# Patient Record
Sex: MALE | Race: WHITE | NOT HISPANIC OR LATINO | Employment: FULL TIME | URBAN - METROPOLITAN AREA
[De-identification: names, ages, dates, MRNs, and addresses within clinical notes are randomized per-mention and may not be internally consistent; named-entity substitution may affect disease eponyms.]

---

## 2022-09-09 ENCOUNTER — HOSPITAL ENCOUNTER (OUTPATIENT)
Dept: PHYSICAL THERAPY | Facility: HOSPITAL | Age: 27
Setting detail: THERAPIES SERIES
Discharge: HOME | End: 2022-09-09

## 2022-09-09 DIAGNOSIS — S93.401A SPRAIN OF RIGHT ANKLE, UNSPECIFIED LIGAMENT, INITIAL ENCOUNTER: Primary | ICD-10-CM

## 2022-09-09 PROCEDURE — 97161 PT EVAL LOW COMPLEX 20 MIN: CPT

## 2022-09-09 PROCEDURE — G0283 ELEC STIM OTHER THAN WOUND: HCPCS

## 2022-09-09 PROCEDURE — 97110 THERAPEUTIC EXERCISES: CPT

## 2022-09-09 NOTE — THERAPY EVALUATION
"    Outpatient Physical Therapy Ortho Initial Evaluation   [facility]     Patient Name: Hank Rubi  : 1995  MRN: [id number]  Today's Date: 2022      Visit Date: 2022    There is no problem list on file for this patient.       No past medical history on file.     Past Surgical History:   Procedure Laterality Date   • EAR TUBES         Visit Dx:     ICD-10-CM ICD-9-CM   1. Sprain of right ankle, unspecified ligament, initial encounter  S93.401A 845.00          Patient History     Row Name 22 1400             History    Chief Complaint Joint stiffness;Muscle tenderness;Muscle weakness;Pain;Swelling  -LN      Type of Pain Ankle pain;Foot pain  Right  -LN      Date Current Problem(s) Began 22  -LN      Brief Description of Current Complaint Pt reports that on 22 he was coming down stairs at work and missed a step and fell down about 5-6 steps; reported having right ankle pain & bruising/swelling a day later (bruising 2 days later). X-ray normal. Pt dx with right ankle sprain. Pt is on light duty now at work. He reports popping in right ankle, but relieves it. \"It is better now but still has some soreness- medial ankle/foot and great toe, 2nd & 3rd toes. Pt wears an active ankle brace right ankle.  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; now on light duty/seated duty.  -LN      Patient seeing anyone else for problem(s)? [employer]  -LN      How has patient tried to help current problem? tylenol/advil combo; active ankle; CP/elevation; rest  -LN      What clinical tests have you had for this " problem? X-ray  -LN      Results of Clinical Tests negative for fx  -LN      Related/Recent Hospitalizations No  -LN      Surgery/Hospitalization n/a  -LN      History of Previous Related Injuries none reported  -LN              Pain     Pain Location Ankle;Foot  Right  -LN      Pain at Present 3-4  -LN      Pain at Best 0  -LN      Pain at Worst 5  -LN      Pain Frequency Intermittent  -LN      Pain Description Aching;Tightness  -LN      What Performance Factors Make the Current Problem(s) WORSE? worse at night when he takes the brace off; going up & down stairs;  -LN      What Performance Factors Make the Current Problem(s) BETTER? rest; elevation; CP; tylenol/advil combo  -LN      Tolerance Time- Standing a little limited  -LN      Tolerance Time- Sitting no limitation  -LN      Tolerance Time- Walking okay with ankle support on; except stairs  -LN      Tolerance Time- Lying limited by pain; better with elevation (disturbed 1-2 x week)  -LN      Is your sleep disturbed? Yes  -LN      Difficulties at work? on light duty/seated duty right now  -LN      Difficulties with ADL's? none reported  -LN      Difficulties with recreational activities? none reported  -LN              Fall Risk Assessment    Any falls in the past year: Yes  -LN      Number of falls reported in the last 12 months 2  -LN      Factors that contributed to the fall: Uneven surface;Tripped;Lost balance  stairs  -LN              Services    Prior Rehab/Home Health Experiences No  -LN      Are you currently receiving Home Health services No  -LN      Do you plan to receive Home Health services in the near future No  -LN              Daily Activities    Primary Language English  -LN      How does patient learn best? Listening;Demonstration  -LN      Teaching needs identified Home Exercise Program;Other (comment)  Risks and benefits of treatment explained to pt.  -LN      Patient is concerned about/has problems with Climbing  Stairs;Flexibility;Performing job responsibilities/community activities (work, school,;Performing sports, recreation, and play activities;Standing;Walking  -LN      Does patient have problems with the following? Depression;Anxiety  -LN      Barriers to learning None  -LN      Pt Participated in POC and Goals Yes  -LN              Safety    Are you being hurt, hit, or frightened by anyone at home or in your life? No  -LN      Are you being neglected by a caregiver No  -LN      Have you had any of the following issues with Depression;Anxiety  -LN            User Key  (r) = Recorded By, (t) = Taken By, (c) = Cosigned By    Initials Name Provider Type    LN Rosanna Estes, PT Physical Therapist                 PT Ortho     Row Name 09/09/22 1400       Precautions and Contraindications    Precautions/Limitations no known precautions/limitations  -LN       Subjective Pain    Able to rate subjective pain? yes  -LN    Pre-Treatment Pain Level 3  3-4/10  -LN       Posture/Observations    Observations Edema;Ecchymosis/bruising  -LN    Posture/Observations Comments mild edema noted; minimal bruising noted medial ankle area.  -LN       Foot/Ankle Palpation    Subtalar Joint Right:;Tender  -LN    Medial Malleolus Tender  -LN    Deltoid Ligament Right:;Tender  -LN    ATFL Right:  popping noted with inversion movement but no pain  -LN       Ankle/Foot Special Tests    Anterior drawer (ATFL lesion) Right:;Negative  -LN    Talar tilt test (instability) Right:;Negative  -LN    Frank test (Achilles’ tendon rupture) Right:;Negative  -LN    Shannon’s sign (DVT) Right:;Negative  -LN    Inversion Stress Test Right:;Negative  -LN    Eversion Stress Test Right:;Negative  -LN    Ankle Special Tests Comments no instability noted but did have a little discomfort with anterior drawer/heel distraction.  -LN       Right Lower Ext    Rt Ankle Dorsiflexion AROM 9 degrees- ankle discomfort  -LN    Rt Ankle Plantarflexion AROM 47 degrees-  ankle discomfort  -LN    Rt Ankle Inversion AROM 44 degrees  -LN    Rt Ankle Eversion AROM 12 degrees  -LN       Left Lower Ext    Lt Ankle Dorsiflexion AROM 10 degrees  -LN    Lt Ankle Plantarflexion AROM 55 degrees  -LN    Lt Ankle Inversion AROM 44 degrees  -LN    Lt Ankle Eversion AROM 20 degrees  -LN       MMT Right Lower Ext    Rt Ankle Plantarflexion MMT, Gross Movement (4+/5) good plus  -LN    Rt Ankle Dorsiflexion MMT, Gross Movement (4/5) good  -LN    Rt Ankle Subtalar Inversion MT, Gross Movement (4/5) good  -LN    Rt Ankle Subtalar Eversion MMT, Gross Movement (4-/5) good minus  -LN       MMT Left Lower Ext    Lt Ankle Plantarflexion MMT, Gross Movement (5/5) normal  -LN    Lt Ankle Dorsiflexion MMT, Gross Movement (5/5) normal  -LN    Lt Ankle Subtalar Inversion MMT, Gross Movement (5/5) normal  -LN    Lt Ankle Subtalar Eversion MMT, Gross Movement (5/5) normal  -LN       Sensation    Sensation WNL? WNL  -LN    Light Touch No apparent deficits  -LN       Lower Extremity Flexibility    Gastrocnemius Right:;Moderately limited  -LN    Soleus Right:;Moderately limited  -LN       Ankle Girth    Mid Tarsal- Right 25.2  -LN    Mid Tarsal- Left 23.6  -LN    MTP Joints- Right 23.5  -LN    MTP Joints- Left 24.4  -LN    Above Malleolus- Right 23.4  -LN    Above Malleolus- Left 21.5  -LN    Mid Gastroc- Right 38.7  -LN    Mid Gastroc- Left 38  -LN       Transfers    Sit-Stand [place] (Transfers) independent  -LN    Stand-Sit [place] (Transfers) independent  -LN    Transfers, Sit-Stand-Sit, Assist Device other (see comments)  none  -LN    Comment, (Transfers) Pt independent with all functional mobility and gait.  -LN       Gait/Stairs (Locomotion)    [place] Level (Gait) independent  -LN    Assistive Device (Gait) other (see comments)  none- wearing right active ankle brace  -LN    Deviations/Abnormal Patterns (Gait) right sided deviations;antalgic  -LN    Comment, (Gait/Stairs) Nominal antalgic gait  noted on R; no AD used and good gait speed noted.  He reports stairs are still painful on right ankle.   -LN          User Key  (r) = Recorded By, (t) = Taken By, (c) = Cosigned By    Initials Name Provider Type    Rosanna Alexis, PT Physical Therapist                            Therapy Education  Education Details: Pt to work on HEP 2 x day as tolerated and use CP as needed; jovanny after exercises and after his shift at work. To also elevate ankle as needed and to try to elevate some during his shift at work. Pt to wear active ankle on R per MD when he is working and up on feet.  Given: HEP, Symptoms/condition management, Edema management  Program: New  How Provided: Verbal, Demonstration, Written  Provided to: Patient  Level of Understanding: Teach back education performed, Verbalized, Demonstrated      PT OP Goals     Row Name 09/09/22 1400          PT Short Term Goals    STG Date to Achieve 09/23/22  -LN     STG 1 Pt to verbally report decreased right ankle/foot pain to 2/10 with everyday home and work activities.  -LN     STG 2 Pt independent with initial HEP issued by therapist.  -LN     STG 3 Right ankle AROM improved to 10 degrees df, 55 degrees pf, and 20 degrees eversion (= to L).  -LN     STG 4 Right ankle strength improved by 1/2 muscle grade.  -LN     STG 5 Right ankle edema decreased by 1/2 cm.  -LN            Long Term Goals    LTG Date to Achieve 10/07/22  -LN     LTG 1 Pt to verbally report decreased right ankle/foot pain to 0/10 with everyday home and work activities.  -LN     LTG 2 Pt independent with more advanced HEP (including closed chain)  issued by therapist.  -LN     LTG 3 Right ankle strength 5/5 all planes.  -LN     LTG 4 Pt able to go up and down a flight of stairs with right ankle pain no > than 1/10.  -LN     LTG 5 Pt able to run with no c/o any right ankle pain or weakness, so he can run at work if needed in an emergency.  -LN     LTG 6 No right ankle edema noted.  -LN      LTG 7 Pt able to return to work on full duty without any difficulty or c/o increased right ankle pain.  -LN            Time Calculation    PT Goal Re-Cert Due Date 10/07/22  -LN           User Key  (r) = Recorded By, (t) = Taken By, (c) = Cosigned By    Initials Name Provider Type    Rosanna Alexis, PT Physical Therapist                 PT Assessment/Plan     Row Name 09/09/22 1400          PT Assessment    Functional Limitations Impaired gait;Limitation in home management;Limitations in community activities;Limitations in functional capacity and performance;Performance in self-care ADL;Performance in work activities  -LN     Impairments Edema;Gait;Impaired flexibility;Muscle strength;Pain;Range of motion  -LN     Assessment Comments Pt presents 2 weeks s/p fall down some steps at work and sustained a right ankle sprain. Pt with pain primarily in medial ankle and foot with minimal tenderness and bruising noted. Pt with decreased right ankle AROM, decreased ankle strength, minimal edema & bruising noted; Pt is ambulating well with only nominal limp on R noted but still does report difficulty and pain with stairs. Pt with signs of Grade 1 right ankle sprain (appears to be a medial ankle sprain affecting the deltoid ligament).  -LN     Please refer to paper survey for additional self-reported information Yes  -LN     Rehab Potential Excellent  -LN     Patient/caregiver participated in establishment of treatment plan and goals Yes  -LN     Patient would benefit from skilled therapy intervention Yes  -LN            PT Plan    PT Frequency 2x/week;3x/week  -LN     Predicted Duration of Therapy Intervention (PT) 2-4 weeks  -LN     Planned CPT's? PT EVAL LOW COMPLEXITY: 99420;PT THER PROC EA 15 MIN: 48466;PT MANUAL THERAPY EA 15 MIN: 43928;PT GAIT TRAINING EA 15 MIN: 46720;PT HOT OR COLD PACK TREAT MCARE;PT ELECTRICAL STIM UNATTEND:   -LN     Physical Therapy Interventions (Optional Details) gait  "training;home exercise program;manual therapy techniques;modalities;patient/family education;ROM (Range of Motion);strengthening;stretching;stair training;taping  -LN     PT Plan Comments See pt 2-3 x week for therapeutic exercises with HEP, modalities PRN (IFC/CP/MH); pt education; gait/stair training as needed; manual therapy; kinesiotape PRN. Add TB exercises next visit as well as standing heel and toe raises as tolerated; airdyne.  Progress with closed chain exercises as tolerated.  -LN           User Key  (r) = Recorded By, (t) = Taken By, (c) = Cosigned By    Initials Name Provider Type    LN Rosanna Estes, PT Physical Therapist                 Modalities     Row Name 09/09/22 1500 09/09/22 1400          Subjective Comments    Subjective Comments -- Pt c/o aching/throb in right ankle and has had some tingling in great toe and 2nd & 3rd toe- reports bruising here and on medial ankle after injury. \"it mostly hurts now when I take the brace off and try and go to sleep.\"   -LN            Ice    Ice Applied Yes  -LN --     Location right ankle and foot with IFC with leg elevated on stool.  -LN --     PT Ice Rx Minutes --  15 min  -LN --     Ice S/P Rx Yes  -LN --            ELECTRICAL STIMULATION    Attended/Unattended Unattended  -LN --     Stimulation Type IFC  -LN --     Location/Electrode Placement/Other Right medial ankle, dorsum of foot, and medial border of foot with IFC.  -LN --     PT E-Stim Unattended Minutes 15  -LN --           User Key  (r) = Recorded By, (t) = Taken By, (c) = Cosigned By    Initials Name Provider Type    Rosanna Alexis, PT Physical Therapist               OP Exercises     Row Name 09/09/22 1400             Precautions    Existing Precautions/Restrictions no known precautions/restrictions  -LN              Subjective Comments    Subjective Comments Pt c/o aching/throb in right ankle and has had some tingling in great toe and 2nd & 3rd toe- reports bruising here " and on medial ankle after injury.  -LN              Subjective Pain    Able to rate subjective pain? yes  -LN      Pre-Treatment Pain Level 3  3-4/10  -LN              Total Minutes    92630 - PT Therapeutic Exercise Minutes 15  -LN              Exercise 1    Exercise Name 1 active AP  -LN      Cueing 1 Verbal;Tactile;Demo  -LN      Reps 1 20  -LN              Exercise 2    Exercise Name 2 active ankle inversion and eversion  -LN      Cueing 2 Verbal;Tactile;Demo  -LN      Reps 2 15  -LN      Additional Comments cueing to not roll leg  -LN              Exercise 3    Exercise Name 3 ankle alphabet  -LN      Cueing 3 Verbal;Demo  -LN      Reps 3 1-2 times- HEP  -LN      Additional Comments upper and lower case  -LN              Exercise 4    Exercise Name 4 NWB gastoc stretch with sheet  -LN      Cueing 4 Verbal;Tactile;Demo  -LN      Reps 4 10  -LN      Time 4 10 sec  -LN              Exercise 5    Exercise Name 5 toe curls on towel  -LN      Cueing 5 Verbal;Tactile;Demo  -LN      Time 5 3 minutes  -LN              Exercise 6    Exercise Name 6 seated calf raises  -LN      Cueing 6 Verbal;Tactile;Demo  -LN      Reps 6 10  -LN      Additional Comments to increase to 20 reps as tolerated  -LN              Exercise 7    Exercise Name 7 seated toe raises  -LN      Cueing 7 Verbal;Tactile;Demo  -LN      Reps 7 10  -LN      Additional Comments to increase to 20 reps as tolerated  -LN            User Key  (r) = Recorded By, (t) = Taken By, (c) = Cosigned By    Initials Name Provider Type    Rosanna Alexis, PT Physical Therapist                              Outcome Measure Options: Lower Extremity Functional Scale (LEFS)  Lower Extremity Functional Index  Any of your usual work, housework or school activities: Moderate difficulty  Your usual hobbies, recreational or sporting activities: A little bit of difficulty  Getting into or out of the bath: No difficulty  Walking between rooms: No difficulty  Putting on  your shoes or socks: A little bit of difficulty  Squatting: A little bit of difficulty  Lifting an object, like a bag of groceries from the floor: No difficulty  Performing light activities around your home: No difficulty  Performing heavy activities around your home: A little bit of difficulty  Getting into or out of a car: No difficulty  Walking 2 blocks: No difficulty  Walking a mile: No difficulty  Going up or down 10 stairs (about 1 flight of stairs): Moderate difficulty  Standing for 1 hour: A little bit of difficulty  Sitting for 1 hour: No difficulty  Running on even ground: A little bit of difficulty  Running on uneven ground: A little bit of difficulty  Making sharp turns while running fast: A little bit of difficulty  Hopping: A little bit of difficulty  Rolling over in bed: No difficulty  Total: 67      Time Calculation:     Start Time: 1415  Stop Time: 1525  Time Calculation (min): 70 min  Timed Charges  42537 - PT Therapeutic Exercise Minutes: 15  Untimed Charges  PT E-Stim Unattended Minutes: 15  PT Ice Rx Minutes:  (15 min)  Total Minutes  Timed Charges Total Minutes: 15  Untimed Charges Total Minutes: 15   Total Minutes: 15     Therapy Charges for Today     Code Description Service Date Service Provider Modifiers Qty    64129130768 HC PT THER PROC EA 15 MIN 9/9/2022 Rosanna Estes, PT GP 1    20675869116 HC PT ELECTRICAL STIM UNATTENDED 9/9/2022 Rosanna Estes, PT  1    93586545724 HC PT EVAL LOW COMPLEXITY 2 9/9/2022 Rosanna Estes, PT GP 1          PT G-Codes  Outcome Measure Options: Lower Extremity Functional Scale (LEFS)  Total: 67         Rosanna Estes, PT  9/9/2022

## 2022-09-12 ENCOUNTER — HOSPITAL ENCOUNTER (OUTPATIENT)
Dept: PHYSICAL THERAPY | Facility: HOSPITAL | Age: 27
Setting detail: THERAPIES SERIES
Discharge: HOME | End: 2022-09-12

## 2022-09-12 DIAGNOSIS — S93.401A SPRAIN OF RIGHT ANKLE, UNSPECIFIED LIGAMENT, INITIAL ENCOUNTER: Primary | ICD-10-CM

## 2022-09-12 PROCEDURE — 97110 THERAPEUTIC EXERCISES: CPT

## 2022-09-12 NOTE — THERAPY TREATMENT NOTE
Outpatient Physical Therapy Ortho Treatment Note   [place]     Patient Name: Hank Rubi  : 1995  MRN: [id number]  Today's Date: 2022      Visit Date: 2022    Visit Dx:    ICD-10-CM ICD-9-CM   1. Sprain of right ankle, unspecified ligament, initial encounter  S93.401A 845.00       There is no problem list on file for this patient.       No past medical history on file.     Past Surgical History:   Procedure Laterality Date   • EAR TUBES                          PT Assessment/Plan     Row Name 22          PT Assessment    Assessment Comments Progress strengthening and ROM with good tolerance.  -KM            PT Plan    PT Plan Comments Add BAPS and balance activity  -KM           User Key  (r) = Recorded By, (t) = Taken By, (c) = Cosigned By    Initials Name Provider Type    Rupal Martines PTA Physical Therapist Assistant                 Modalities     Row Name 22             Subjective Comments    Subjective Comments Pt states his ankle is doing better with mild soreness  -KM              Ice    Location right ankle and foot with IFC with leg elevated on stool.  -KM      PT Ice Rx Minutes --  15 min  -KM      Ice S/P Rx Yes  -KM              ELECTRICAL STIMULATION    Attended/Unattended Unattended  -KM      Stimulation Type IFC  -KM      Location/Electrode Placement/Other Right medial ankle, dorsum of foot, and medial border of foot with IFC.  -KM            User Key  (r) = Recorded By, (t) = Taken By, (c) = Cosigned By    Initials Name Provider Type    Rupal Martines PTA Physical Therapist Assistant               OP Exercises     Row Name 22             Precautions    Existing Precautions/Restrictions no known precautions/restrictions  -KM              Subjective Comments    Subjective Comments Pt states his ankle is doing better with mild soreness  -KM              Exercise 1    Exercise Name 1 NWB Gastroc/Soleus Stretch  -KM      Cueing 1 --   -KM      Reps 1 10x  -KM      Time 1 10 sec hold  -KM              Exercise 2    Exercise Name 2 4-Way Ankle  -KM      Cueing 2 --  -KM      Reps 2 25  -KM      Time 2 Black  -KM              Exercise 3    Exercise Name 3 ProStretch  -KM      Cueing 3 --  -KM      Reps 3 --  -KM      Time 3 3 min  -KM              Exercise 4    Exercise Name 4 Rockboard  -KM      Cueing 4 --  -KM      Reps 4 10x CW/CCW  -KM      Time 4 --  -KM              Exercise 5    Exercise Name 5 Towel Curls  -KM      Cueing 5 --  -KM      Reps 5 1#  -KM      Time 5 5 min  -KM              Exercise 6    Exercise Name 6 Heel Raises  -KM      Cueing 6 --  -KM      Reps 6 25  -KM              Exercise 7    Exercise Name 7 Single Leg Balance  -KM      Cueing 7 --  -KM      Reps 7 --  -KM              Exercise 8    Exercise Name 8 DD: Ball Circles  -KM            User Key  (r) = Recorded By, (t) = Taken By, (c) = Cosigned By    Initials Name Provider Type    Rupal Martines PTA Physical Therapist Assistant                                                Time Calculation:   Start Time: 0925  Stop Time: 1010  Time Calculation (min): 45 min  Untimed Charges  PT Ice Rx Minutes:  (15 min)  Therapy Charges for Today     Code Description Service Date Service Provider Modifiers Qty    18504075608 HC PT THER PROC EA 15 MIN 9/12/2022 Rupal Delaney PTA GP 2                    Rupal Delaney PTA  9/12/2022

## 2022-09-14 ENCOUNTER — HOSPITAL ENCOUNTER (OUTPATIENT)
Dept: PHYSICAL THERAPY | Facility: HOSPITAL | Age: 27
Setting detail: THERAPIES SERIES
Discharge: HOME | End: 2022-09-14

## 2022-09-14 DIAGNOSIS — S93.401A SPRAIN OF RIGHT ANKLE, UNSPECIFIED LIGAMENT, INITIAL ENCOUNTER: Primary | ICD-10-CM

## 2022-09-14 PROCEDURE — 97110 THERAPEUTIC EXERCISES: CPT

## 2022-09-14 NOTE — THERAPY TREATMENT NOTE
Outpatient Physical Therapy Ortho Treatment Note   [place]     Patient Name: Hank Rubi  : 1995  MRN: [id number]  Today's Date: 2022      Visit Date: 2022    Visit Dx:    ICD-10-CM ICD-9-CM   1. Sprain of right ankle, unspecified ligament, initial encounter  S93.401A 845.00       There is no problem list on file for this patient.       No past medical history on file.     Past Surgical History:   Procedure Laterality Date   • EAR TUBES                          PT Assessment/Plan     Row Name 22 0740          PT Assessment    Assessment Comments Pt tolerated progression of strengthening well including single leg balance activity.  -KM            PT Plan    PT Plan Comments Continue per POC.  -KM           User Key  (r) = Recorded By, (t) = Taken By, (c) = Cosigned By    Initials Name Provider Type    Rupal Martines PTA Physical Therapist Assistant                 Modalities     Row Name 22 0771             Ice    Location right ankle and foot with IFC with leg elevated on stool.  -KM      PT Ice Rx Minutes --  15 min  -KM      Ice S/P Rx Yes  -KM              ELECTRICAL STIMULATION    Attended/Unattended Unattended  -KM      Stimulation Type IFC  -KM      Location/Electrode Placement/Other Right medial ankle, dorsum of foot, and medial border of foot with IFC.  -KM            User Key  (r) = Recorded By, (t) = Taken By, (c) = Cosigned By    Initials Name Provider Type    Rupal Martines PTA Physical Therapist Assistant               OP Exercises     Row Name 22 0797             Precautions    Existing Precautions/Restrictions no known precautions/restrictions  -KM              Subjective Comments    Subjective Comments Pt states he feels is ankle is getting better. States he notice some weakness primarily with steps. Pt has been compliant with HEP  -KM              Exercise 1    Exercise Name 1 NWB Gastroc/Soleus Stretch  -KM      Reps 1 10x  -KM      Time 1 10  sec hold  -KM              Exercise 2    Exercise Name 2 4-Way Ankle  -KM      Reps 2 25  -KM      Time 2 Black  -KM              Exercise 3    Exercise Name 3 ProStretch  -KM      Time 3 3 min  -KM              Exercise 4    Exercise Name 4 BAPS  -KM      Reps 4 L1 x 15 CW/CCW  -KM      Time 4 L2 x 15 CW/CCW  -KM              Exercise 5    Exercise Name 5 Towel Curls  -KM      Reps 5 1#  -KM      Time 5 5 min  -KM              Exercise 6    Exercise Name 6 Heel Raises  -KM      Reps 6 Double Leg x 50  -KM      Time 6 Single Leg x 25  -KM              Exercise 7    Exercise Name 7 SLB on BAF  -KM      Reps 7 3x30 seconds each  -KM              Exercise 8    Exercise Name 8 DD: Ball Circles  -KM      Reps 8 10x CW/CCW  -KM            User Key  (r) = Recorded By, (t) = Taken By, (c) = Cosigned By    Initials Name Provider Type    Rupal Martines PTA Physical Therapist Assistant                                                Time Calculation:   Start Time: 0740  Stop Time: 0830  Time Calculation (min): 50 min  Untimed Charges  PT Ice Rx Minutes:  (15 min)  Therapy Charges for Today     Code Description Service Date Service Provider Modifiers Qty    32582172387 HC PT THER PROC EA 15 MIN 9/14/2022 Rupal Delaney PTA GP 2                    Rupal Delaney PTA  9/14/2022

## 2022-09-19 ENCOUNTER — APPOINTMENT (OUTPATIENT)
Dept: PHYSICAL THERAPY | Facility: HOSPITAL | Age: 27
End: 2022-09-19

## 2022-09-21 ENCOUNTER — HOSPITAL ENCOUNTER (OUTPATIENT)
Dept: PHYSICAL THERAPY | Facility: HOSPITAL | Age: 27
Setting detail: THERAPIES SERIES
Discharge: HOME | End: 2022-09-21

## 2022-09-21 DIAGNOSIS — S93.401A SPRAIN OF RIGHT ANKLE, UNSPECIFIED LIGAMENT, INITIAL ENCOUNTER: Primary | ICD-10-CM

## 2022-09-21 PROCEDURE — 97110 THERAPEUTIC EXERCISES: CPT

## 2022-09-21 NOTE — THERAPY TREATMENT NOTE
Outpatient Physical Therapy Ortho Treatment Note   [place]     Patient Name: Hank Rubi  : 1995  MRN: [id number]  Today's Date: 2022      Visit Date: 2022    Visit Dx:    ICD-10-CM ICD-9-CM   1. Sprain of right ankle, unspecified ligament, initial encounter  S93.401A 845.00       There is no problem list on file for this patient.       No past medical history on file.     Past Surgical History:   Procedure Laterality Date   • EAR TUBES                          PT Assessment/Plan     Row Name 22 0750          PT Assessment    Assessment Comments Pt presents to clinic in tall walking boot. Due to change in status and possible fracture per pt, modified treatment session to NWBing activity. Pt tolerated well with reports of minimal soreness  -KM            PT Plan    PT Plan Comments Continue POC per MD.  -KM           User Key  (r) = Recorded By, (t) = Taken By, (c) = Cosigned By    Initials Name Provider Type    Rupal Martines PTA Physical Therapist Assistant                 Modalities     Row Name 22 0750             Ice    Location right ankle and foot with IFC with leg elevated on stool.  -KM      PT Ice Rx Minutes --  15 min  -KM      Ice S/P Rx Yes  -KM              ELECTRICAL STIMULATION    Attended/Unattended Unattended  -KM      Stimulation Type IFC  -KM      Location/Electrode Placement/Other Right medial ankle, dorsum of foot, and medial border of foot with IFC.  -KM            User Key  (r) = Recorded By, (t) = Taken By, (c) = Cosigned By    Initials Name Provider Type    Rupal Martines PTA Physical Therapist Assistant               OP Exercises     Row Name 22 0750             Precautions    Existing Precautions/Restrictions no known precautions/restrictions  -KM              Subjective Comments    Subjective Comments Pt states he had a f/u appointment with Workers [employer], pt states they put him in a tall walking booting with concerns of a fracture. Pt  states his ankle is sore after previous session, but feels it is getting better with decreased  swelling. Pt states he has an appointment with ortho on Friday.  -KM              Exercise 1    Exercise Name 1 NWB Gastroc/Soleus Stretch  -KM      Reps 1 10x  -KM      Time 1 10 sec hold  -KM              Exercise 2    Exercise Name 2 4-Way Ankle  -KM      Reps 2 25  -KM      Time 2 Black  -KM              Exercise 3    Exercise Name 3 ProStretch  -KM      Time 3 3 min  -KM              Exercise 4    Exercise Name 4 RockBoard  -KM      Reps 4 20x CW/CCW  -KM      Time 4 --  -KM              Exercise 5    Exercise Name 5 Towel Curls  -KM      Reps 5 1#  -KM      Time 5 5 min  -KM              Exercise 6    Exercise Name 6 Heel Raises  -KM      Reps 6 --  -KM      Time 6 --  -KM              Exercise 7    Exercise Name 7 SLB on BAF  -KM      Reps 7 --  -KM              Exercise 8    Exercise Name 8 DD: Ball Circles  -KM      Reps 8 --  -KM            User Key  (r) = Recorded By, (t) = Taken By, (c) = Cosigned By    Initials Name Provider Type    Rupal Martines PTA Physical Therapist Assistant                                                Time Calculation:   Start Time: 0750  Stop Time: 0840  Time Calculation (min): 50 min  Untimed Charges  PT Ice Rx Minutes:  (15 min)  Therapy Charges for Today     Code Description Service Date Service Provider Modifiers Qty    19447019247 HC PT THER PROC EA 15 MIN 9/21/2022 Rupal Delaney PTA GP 2                    Rupal Delaney PTA  9/21/2022

## 2022-09-23 ENCOUNTER — OFFICE VISIT (OUTPATIENT)
Dept: ORTHOPEDIC SURGERY | Facility: CLINIC | Age: 27
End: 2022-09-23

## 2022-09-23 VITALS
SYSTOLIC BLOOD PRESSURE: 121 MMHG | HEIGHT: 72 IN | HEART RATE: 73 BPM | DIASTOLIC BLOOD PRESSURE: 78 MMHG | WEIGHT: 204.8 LBS | BODY MASS INDEX: 27.74 KG/M2

## 2022-09-23 DIAGNOSIS — G89.29 CHRONIC PAIN OF RIGHT ANKLE: ICD-10-CM

## 2022-09-23 DIAGNOSIS — M25.571 CHRONIC PAIN OF RIGHT ANKLE: ICD-10-CM

## 2022-09-23 DIAGNOSIS — S93.411A SPRAIN OF CALCANEOFIBULAR LIGAMENT OF RIGHT ANKLE, INITIAL ENCOUNTER: Primary | ICD-10-CM

## 2022-09-23 PROCEDURE — 99203 OFFICE O/P NEW LOW 30 MIN: CPT | Performed by: INTERNAL MEDICINE

## 2022-09-23 PROCEDURE — 73630 X-RAY EXAM OF FOOT: CPT | Performed by: INTERNAL MEDICINE

## 2022-09-23 RX ORDER — OMEPRAZOLE 20 MG/1
20 CAPSULE, DELAYED RELEASE ORAL DAILY
COMMUNITY
Start: 2022-08-08

## 2022-09-23 RX ORDER — HYDROXYZINE 50 MG/1
25 TABLET, FILM COATED ORAL
COMMUNITY
Start: 2022-04-20

## 2022-09-23 RX ORDER — METOPROLOL SUCCINATE 25 MG/1
25 TABLET, EXTENDED RELEASE ORAL DAILY
COMMUNITY
Start: 2022-06-17

## 2022-09-23 RX ORDER — VORTIOXETINE 20 MG/1
1 TABLET, FILM COATED ORAL DAILY
COMMUNITY
Start: 2022-07-27

## 2022-09-23 RX ORDER — GALCANEZUMAB 120 MG/ML
INJECTION, SOLUTION SUBCUTANEOUS
COMMUNITY
Start: 2022-09-09

## 2022-09-23 RX ORDER — MELOXICAM 15 MG/1
TABLET ORAL
COMMUNITY
Start: 2022-09-15

## 2022-09-23 RX ORDER — DEXTROAMPHETAMINE SULFATE, DEXTROAMPHETAMINE SACCHARATE, AMPHETAMINE SULFATE AND AMPHETAMINE ASPARTATE 5; 5; 5; 5 MG/1; MG/1; MG/1; MG/1
CAPSULE, EXTENDED RELEASE ORAL
COMMUNITY
Start: 2022-09-14

## 2022-09-23 RX ORDER — ELETRIPTAN HYDROBROMIDE 40 MG/1
TABLET, FILM COATED ORAL
COMMUNITY
Start: 2022-07-27

## 2022-09-23 NOTE — PROGRESS NOTES
"Subjective:     Patient ID: Hank Rubi is a 27 y.o. male.    Chief Complaint:    History of Present Illness  Hank Rubi presents to clinic today for evaluation of right ankle pain.  The patient states that he sustained an inversion injury to his right ankle on 8/23/2022 while he was at work and slipped down a spiral staircase.  He went to [facility] afterwards and had an x-ray which showed no fracture.  He was placed in a cam boot and sent for physical therapy.  He presents today for further evaluation and management.  He is hopeful to come out of the boot today and is feeling much better.  He is doing physical therapy twice a week.     Social History     Occupational History   • Not on file   Tobacco Use   • Smoking status: Never Smoker   • Smokeless tobacco: Never Used   Vaping Use   • Vaping Use: Never used   Substance and Sexual Activity   • Alcohol use: Never   • Drug use: Never   • Sexual activity: Defer      Past Medical History:   Diagnosis Date   • ADHD    • Asthma    • Depression    • Tourette's      Past Surgical History:   Procedure Laterality Date   • EAR TUBES         History reviewed. No pertinent family history.              Objective:  Vitals:    09/23/22 1029   BP: 121/78   BP Location: Right arm   Patient Position: Sitting   Cuff Size: Adult   Pulse: 73   Weight: 92.9 kg (204 lb 12.8 oz)   Height: 182.9 cm (72\")         09/23/22  1029   Weight: 92.9 kg (204 lb 12.8 oz)     Body mass index is 27.78 kg/m².        Right Ankle Exam     Tenderness   The patient is experiencing tenderness in the medial malleolus, deltoid, lateral malleolus, CF and ATF.  Swelling: mild    Range of Motion   Dorsiflexion: normal   Plantar flexion: normal   Eversion: normal   Inversion: normal     Tests   Anterior drawer: negative  Varus tilt: negative    Other   Erythema: absent  Scars: absent  Sensation: normal  Pulse: present     Comments:  Mild medial and lateral tenderness with mild swelling.  Ankle appears " stable.  4+/5 ankle plantarflexion dorsiflexion inversion and eversion               Imaging:  3 views of the right ankle and foot were ordered and independently reviewed by myself in the office today.  X-rays show no signs of fracture dislocation or subluxation.  No soft tissue swelling appreciated.  All tarsal bones appear intact and reduced.  Ankle mortise intact.    Assessment:        1. Sprain of calcaneofibular ligament of right ankle, initial encounter    2. Chronic pain of right ankle           Plan:          1. Discussed treatment options at length with patient at today's visit.  The patient should continue physical therapy twice a week for range of motion strengthening and gait training exercises.  He should begin to transition out of the cam boot.  He should remain on light duty at work.  We will see the patient back in 3 weeks with no need for x-rays for evaluation and hopefully release to full duty at work  2. Follow up: 2 weeks      Hnak Rubi was in agreement with plan and had all questions answered.     Medications:  No orders of the defined types were placed in this encounter.      Followup:  Return in about 3 weeks (around 10/14/2022).    Diagnoses and all orders for this visit:    1. Sprain of calcaneofibular ligament of right ankle, initial encounter (Primary)  -     XR Foot 3+ View Right    2. Chronic pain of right ankle  -     XR Ankle 3+ View Right          Dictated utilizing Dragon dictation

## 2022-09-27 ENCOUNTER — HOSPITAL ENCOUNTER (OUTPATIENT)
Dept: PHYSICAL THERAPY | Facility: HOSPITAL | Age: 27
Setting detail: THERAPIES SERIES
Discharge: HOME | End: 2022-09-27

## 2022-09-27 ENCOUNTER — TELEPHONE (OUTPATIENT)
Dept: ORTHOPEDIC SURGERY | Facility: CLINIC | Age: 27
End: 2022-09-27

## 2022-09-27 DIAGNOSIS — S93.401A SPRAIN OF RIGHT ANKLE, UNSPECIFIED LIGAMENT, INITIAL ENCOUNTER: Primary | ICD-10-CM

## 2022-09-27 PROCEDURE — 97110 THERAPEUTIC EXERCISES: CPT

## 2022-09-27 NOTE — TELEPHONE ENCOUNTER
Caller: PATIENT    Relationship: SELF     Best call back number: [phone]    What form or medical record are you requesting: PATIENT IS REQUESTING A COPY OF THE WORK NOTE FROM 09.23.22 TO BE FAXED TO HIS EMPLOYER DUE TO THE ORIGINAL NOTE BEING MISPLACED.     Who is requesting this form or medical record from you: PATIENT    How would you like to receive the form or medical records (pick-up, mail, fax):   If fax, what is the fax number: [phone]  ATTN: CODY    Timeframe paperwork needed: ASAP    Additional notes: PATIENT CALLED TO REQUEST A COPY OF HIS WORK NOTE FROM DR. VIZCAINO ON 09.23.22 TO BE FAXED [phone] AND PUT ATTN: CODY ASAP. PATIENT STATED THE ORIGINAL NOTE WAS MISPLACED. THANK YOU!

## 2022-09-27 NOTE — THERAPY TREATMENT NOTE
Outpatient Physical Therapy Ortho Treatment Note   [place]     Patient Name: Hank Rubi  : 1995  MRN: [id number]  Today's Date: 2022      Visit Date: 2022    Visit Dx:    ICD-10-CM ICD-9-CM   1. Sprain of right ankle, unspecified ligament, initial encounter  S93.401A 845.00       There is no problem list on file for this patient.       Past Medical History:   Diagnosis Date   • ADHD    • Asthma    • Depression    • Tourette's         Past Surgical History:   Procedure Laterality Date   • EAR TUBES                          PT Assessment/Plan     Row Name 22 0810          PT Assessment    Assessment Comments Incorporated WBing activity back into plan with good tolerance.  -KM            PT Plan    PT Plan Comments Continue per POC  -KM           User Key  (r) = Recorded By, (t) = Taken By, (c) = Cosigned By    Initials Name Provider Type    Rupal Martines, MARILYN Physical Therapist Assistant                   OP Exercises     Row Name 22             Precautions    Existing Precautions/Restrictions no known precautions/restrictions  -KM              Subjective Comments    Subjective Comments Pt states his f/u appointment went well. X-ray was negative for fracture, he can transition out of the boot and keep with light duty at work until next appointment.  -KM              Exercise 1    Exercise Name 1 NWB Gastroc/Soleus Stretch  -KM      Reps 1 10x  -KM      Time 1 10 sec hold  -KM              Exercise 2    Exercise Name 2 4-Way Ankle  -KM      Reps 2 25  -KM      Time 2 Black  -KM              Exercise 3    Exercise Name 3 ProStretch  -KM      Time 3 3 min  -KM              Exercise 4    Exercise Name 4 BAPS  -KM      Reps 4 L1 x 15 CW/CCW  -KM      Time 4 L2 x 10 CW/CCW  -KM              Exercise 5    Exercise Name 5 Towel Curls  -KM      Reps 5 1#  -KM      Time 5 5 min  -KM              Exercise 6    Exercise Name 6 Heel Raises  -KM      Reps 6 25  -KM               Exercise 7    Exercise Name 7 SLB  -KM      Reps 7 3x 30 seconds each  -KM              Exercise 8    Exercise Name 8 DD: Ball Circles  -KM            User Key  (r) = Recorded By, (t) = Taken By, (c) = Cosigned By    Initials Name Provider Type    Rupal Martines PTA Physical Therapist Assistant                                                Time Calculation:   Start Time: 0810  Stop Time: 0900  Time Calculation (min): 50 min  Therapy Charges for Today     Code Description Service Date Service Provider Modifiers Qty    21959863803  PT THER PROC EA 15 MIN 9/27/2022 Rpual Delaney PTA GP 2                    Rupal Delaney PTA  9/27/2022

## 2022-09-28 NOTE — TELEPHONE ENCOUNTER
PATIENT CALLED TO CHECK STATUS OF  S WORK  RESTRICTION NOTE - PER PATIENT HIS ORIGINAL LIGHT DUTY ENDS TONIGHT AND NEEDS IT TO BE EXTENDED - OTHERWISE, HE WILL BE SENT HOME TONIGHT.  REQUESTS A CALL BACK, ASAP.  PER PATIENT HE DROPPED OFF THE FORM THIS MORNING & WAS ADVISED TO C/B TO CHECK THE STATUS.  HUB ADVISED WOULD NOTIFY PRACTICE AND SOMEONE WOULD CALL HIM BACK.

## 2022-09-29 ENCOUNTER — HOSPITAL ENCOUNTER (OUTPATIENT)
Dept: PHYSICAL THERAPY | Facility: HOSPITAL | Age: 27
Setting detail: THERAPIES SERIES
Discharge: HOME | End: 2022-09-29

## 2022-09-29 DIAGNOSIS — S93.401A SPRAIN OF RIGHT ANKLE, UNSPECIFIED LIGAMENT, INITIAL ENCOUNTER: Primary | ICD-10-CM

## 2022-09-29 PROCEDURE — 97110 THERAPEUTIC EXERCISES: CPT

## 2022-09-29 NOTE — THERAPY TREATMENT NOTE
Outpatient Physical Therapy Ortho Treatment Note   [place]     Patient Name: Hank Rubi  : 1995  MRN: [id number]  Today's Date: 2022      Visit Date: 2022    Visit Dx:    ICD-10-CM ICD-9-CM   1. Sprain of right ankle, unspecified ligament, initial encounter  S93.401A 845.00       There is no problem list on file for this patient.       Past Medical History:   Diagnosis Date   • ADHD    • Asthma    • Depression    • Tourette's         Past Surgical History:   Procedure Laterality Date   • EAR TUBES                          PT Assessment/Plan     Row Name 22          PT Assessment    Assessment Comments Continue to progress strengthening including additional CKC exercises to replicate work related activity.  -KM            PT Plan    PT Plan Comments Pt to continue with HEP. Progress as tolerated.  -KM           User Key  (r) = Recorded By, (t) = Taken By, (c) = Cosigned By    Initials Name Provider Type    Rupal Martines PTA Physical Therapist Assistant                 Modalities     Row Name 22             Ice    Location right ankle and foot with IFC with leg elevated on stool.  -KM      PT Ice Rx Minutes --  15 min  -KM      Ice S/P Rx Yes  -KM              ELECTRICAL STIMULATION    Attended/Unattended Unattended  -KM      Stimulation Type IFC  -KM      Location/Electrode Placement/Other Right medial ankle, dorsum of foot, and medial border of foot with IFC.  -KM            User Key  (r) = Recorded By, (t) = Taken By, (c) = Cosigned By    Initials Name Provider Type    Rupal Martines PTA Physical Therapist Assistant               OP Exercises     Row Name 22             Precautions    Existing Precautions/Restrictions no known precautions/restrictions  -KM              Subjective Comments    Subjective Comments Pt states his ankle is doing well and denies soreness after progression of ther ex.  -KM              Exercise 1    Exercise Name 1 NWB  "Gastroc/Soleus Stretch  -KM      Reps 1 10x  -KM      Time 1 10 sec hold  -KM              Exercise 2    Exercise Name 2 5-Way Ankle  -KM      Reps 2 25  -KM      Time 2 silver  -KM              Exercise 3    Exercise Name 3 ProStretch  -KM              Exercise 4    Exercise Name 4 BAPS  -KM      Reps 4 L2 x 15 CW/CCW  -KM      Time 4 L3 x 10 CW/CCW  -KM              Exercise 5    Exercise Name 5 Towel Curls  -KM      Reps 5 1#  -KM      Time 5 5 min  -KM              Exercise 6    Exercise Name 6 Heel Raises: Off Step  -KM      Reps 6 25  -KM              Exercise 7    Exercise Name 7 SLB on BAF  -KM      Reps 7 3x 30 seconds each  -KM              Exercise 8    Exercise Name 8 DD: Ball Circles  -KM              Exercise 9    Exercise Name 9 6\" Fwd Step Ups  -KM      Reps 9 20x each  -KM            User Key  (r) = Recorded By, (t) = Taken By, (c) = Cosigned By    Initials Name Provider Type    Rupal Martines PTA Physical Therapist Assistant                                                Time Calculation:   Start Time: 0750  Stop Time: 0840  Time Calculation (min): 50 min  Untimed Charges  PT Ice Rx Minutes:  (15 min)  Therapy Charges for Today     Code Description Service Date Service Provider Modifiers Qty    21853505169 HC PT THER PROC EA 15 MIN 9/29/2022 Rupal Delaney PTA GP 2                    Rupal Delaney PTA  9/29/2022     "

## 2022-10-05 ENCOUNTER — HOSPITAL ENCOUNTER (OUTPATIENT)
Dept: PHYSICAL THERAPY | Facility: HOSPITAL | Age: 27
Setting detail: THERAPIES SERIES
Discharge: HOME | End: 2022-10-05

## 2022-10-05 DIAGNOSIS — S93.401A SPRAIN OF RIGHT ANKLE, UNSPECIFIED LIGAMENT, INITIAL ENCOUNTER: Primary | ICD-10-CM

## 2022-10-05 PROCEDURE — 97110 THERAPEUTIC EXERCISES: CPT

## 2022-10-05 NOTE — THERAPY TREATMENT NOTE
Outpatient Physical Therapy Ortho Treatment Note   [place]     Patient Name: Hank Rubi  : 1995  MRN: [id number]  Today's Date: 10/5/2022      Visit Date: 10/05/2022    Visit Dx:    ICD-10-CM ICD-9-CM   1. Sprain of right ankle, unspecified ligament, initial encounter  S93.401A 845.00       There is no problem list on file for this patient.       Past Medical History:   Diagnosis Date   • ADHD    • Asthma    • Depression    • Tourette's         Past Surgical History:   Procedure Laterality Date   • EAR TUBES                          PT Assessment/Plan     Row Name 10/05/22 0800          PT Assessment    Assessment Comments Continue to progress functional activity with good tolerance. Improved ability to complete single leg and balance exercises.  -KM            PT Plan    PT Plan Comments Plan to see pt 1x next prior to MD appointment.  -KM           User Key  (r) = Recorded By, (t) = Taken By, (c) = Cosigned By    Initials Name Provider Type    Rupal Martines PTA Physical Therapist Assistant                 Modalities     Row Name 10/05/22 0800             Ice    Location right ankle and foot with IFC with leg elevated on stool.  -KM      PT Ice Rx Minutes --  15 min  -KM      Ice S/P Rx Yes  -KM              ELECTRICAL STIMULATION    Attended/Unattended Unattended  -KM      Stimulation Type IFC  -KM      Location/Electrode Placement/Other Right medial ankle, dorsum of foot, and medial border of foot with IFC.  -KM            User Key  (r) = Recorded By, (t) = Taken By, (c) = Cosigned By    Initials Name Provider Type    Rupal Martines PTA Physical Therapist Assistant               OP Exercises     Row Name 10/05/22 0800             Precautions    Existing Precautions/Restrictions no known precautions/restrictions  -KM              Subjective Comments    Subjective Comments Pt states he has experience minimal swelling, but doing well overall. States he will wear his ankle brace as needed.  " -KM              Exercise 1    Exercise Name 1 NWB Gastroc/Soleus Stretch  -KM      Reps 1 10x  -KM      Time 1 10 sec hold  -KM              Exercise 2    Exercise Name 2 5-Way Ankle  -KM      Reps 2 25  -KM      Time 2 silver  -KM              Exercise 3    Exercise Name 3 ProStretch  -KM              Exercise 4    Exercise Name 4 BAPS  -KM      Reps 4 L3 x 20 CW/CCW  -KM      Time 4 L4 x 15 CW/CCW  -KM              Exercise 5    Exercise Name 5 Towel Curls  -KM      Reps 5 2#`  -KM      Time 5 5 min  -KM              Exercise 6    Exercise Name 6 Heel Raises  -KM      Reps 6 Double Leg Off Step x 25  -KM      Time 6 Single Leg x 25  -KM              Exercise 7    Exercise Name 7 SLB on BAF  -KM      Reps 7 3x 30 seconds each  -KM              Exercise 8    Exercise Name 8 DD: Ball Circles  -KM      Reps 8 10x CW/CCW  -KM              Exercise 9    Exercise Name 9 6\" Fwd Step Ups  -KM      Reps 9 25x each  -KM              Exercise 10    Exercise Name 10 6\" Lat Step Overs  -KM      Reps 10 25  -KM            User Key  (r) = Recorded By, (t) = Taken By, (c) = Cosigned By    Initials Name Provider Type    Rupal Martines PTA Physical Therapist Assistant                                                Time Calculation:   Start Time: 0800  Stop Time: 0845  Time Calculation (min): 45 min  Untimed Charges  PT Ice Rx Minutes:  (15 min)  Therapy Charges for Today     Code Description Service Date Service Provider Modifiers Qty    42220274041 HC PT THER PROC EA 15 MIN 10/5/2022 Rupal Delaney PTA GP 2                    Rupal Delaney PTA  10/5/2022     "

## 2022-10-13 ENCOUNTER — HOSPITAL ENCOUNTER (OUTPATIENT)
Dept: PHYSICAL THERAPY | Facility: HOSPITAL | Age: 27
Setting detail: THERAPIES SERIES
Discharge: HOME | End: 2022-10-13

## 2022-10-13 DIAGNOSIS — S93.401A SPRAIN OF RIGHT ANKLE, UNSPECIFIED LIGAMENT, INITIAL ENCOUNTER: Primary | ICD-10-CM

## 2022-10-13 PROCEDURE — 97110 THERAPEUTIC EXERCISES: CPT

## 2022-10-13 NOTE — THERAPY TREATMENT NOTE
Outpatient Physical Therapy Ortho Treatment Note   [place]     Patient Name: Hank Rubi  : 1995  MRN: [id number]  Today's Date: 10/13/2022      Visit Date: 10/13/2022    Visit Dx:    ICD-10-CM ICD-9-CM   1. Sprain of right ankle, unspecified ligament, initial encounter  S93.401A 845.00       There is no problem list on file for this patient.       Past Medical History:   Diagnosis Date   • ADHD    • Asthma    • Depression    • Tourette's         Past Surgical History:   Procedure Laterality Date   • EAR TUBES                          PT Assessment/Plan     Row Name 10/13/22 0825          PT Assessment    Assessment Comments Pt has made good progress toward goals with overall improved functional mobility. Continue to progress single leg strength and balance and pt tolerated well without reports of pain.  -KM        PT Plan    PT Plan Comments Continue POC per MD.  -KM           User Key  (r) = Recorded By, (t) = Taken By, (c) = Cosigned By    Initials Name Provider Type    Rupal Martines PTA Physical Therapist Assistant                 Modalities     Row Name 10/13/22 0825             Ice    Location right ankle and foot with IFC with leg elevated on stool.  -KM      PT Ice Rx Minutes --  15 min  -KM      Ice S/P Rx Yes  -KM         ELECTRICAL STIMULATION    Attended/Unattended Unattended  -KM      Stimulation Type IFC  -KM      Location/Electrode Placement/Other Right medial ankle, dorsum of foot, and medial border of foot with IFC.  -KM            User Key  (r) = Recorded By, (t) = Taken By, (c) = Cosigned By    Initials Name Provider Type    Rupal Martines PTA Physical Therapist Assistant               OP Exercises     Row Name 10/13/22 0825             Precautions    Existing Precautions/Restrictions no known precautions/restrictions  -KM         Subjective Comments    Subjective Comments Pt states his ankle is doing better. States he was on vacation and tolerated additional walking  "well including uneven surfaces. States he experienced symptoms only while driving. Pt to see MD tomorrow  -KM         Exercise 1    Exercise Name 1 NWB Gastroc/Soleus Stretch  -KM      Reps 1 10x  -KM      Time 1 10 sec hold  -KM         Exercise 2    Exercise Name 2 5-Way Ankle  -KM      Reps 2 25  -KM      Time 2 silver  -KM         Exercise 3    Exercise Name 3 ProStretch  -KM         Exercise 4    Exercise Name 4 BAPS  -KM      Reps 4 L3 x 20 CW/CCW  -KM      Time 4 L4 x 15 CW/CCW  -KM         Exercise 5    Exercise Name 5 Towel Curls  -KM      Reps 5 2#`  -KM      Time 5 5 min  -KM         Exercise 6    Exercise Name 6 Heel Raises  -KM      Reps 6 Double Leg Off Step x 25  -KM      Time 6 Single Leg x 25  -KM         Exercise 7    Exercise Name 7 3-Way SLB Trampline Toss  -KM      Reps 7 25x each  -KM         Exercise 8    Exercise Name 8 DD: Ball Circles  -KM      Reps 8 20x CW/CCW  -KM         Exercise 9    Exercise Name 9 6\" Fwd Step Ups  -KM      Reps 9 25x each  -KM      Time 9 10# UE  -KM         Exercise 10    Exercise Name 10 6\" Lat Step Overs  -KM      Reps 10 25  -KM         Exercise 11    Exercise Name 11 CC: Retro & Lateral  -KM      Reps 11 5x each  -KM      Time 11 35/25#  -KM            User Key  (r) = Recorded By, (t) = Taken By, (c) = Cosigned By    Initials Name Provider Type    Rupal Martines PTA Physical Therapist Assistant                                                Time Calculation:   Start Time: 0825  Stop Time: 0920  Time Calculation (min): 55 min  Untimed Charges  PT Ice Rx Minutes:  (15 min)  Therapy Charges for Today     Code Description Service Date Service Provider Modifiers Qty    61141709438 HC PT THER PROC EA 15 MIN 10/13/2022 Rupal Delaney PTA GP 2                    Rupal Delaney PTA  10/13/2022     "

## 2022-10-14 ENCOUNTER — OFFICE VISIT (OUTPATIENT)
Dept: ORTHOPEDIC SURGERY | Facility: CLINIC | Age: 27
End: 2022-10-14

## 2022-10-14 VITALS — HEIGHT: 72 IN | WEIGHT: 204 LBS | BODY MASS INDEX: 27.63 KG/M2

## 2022-10-14 DIAGNOSIS — S93.411A SPRAIN OF CALCANEOFIBULAR LIGAMENT OF RIGHT ANKLE, INITIAL ENCOUNTER: Primary | ICD-10-CM

## 2022-10-14 PROCEDURE — 99213 OFFICE O/P EST LOW 20 MIN: CPT | Performed by: INTERNAL MEDICINE

## 2022-10-14 NOTE — PROGRESS NOTES
"Subjective:     Patient ID: Hank Rubi is a 27 y.o. male.    Chief Complaint:    History of Present Illness  Hank Rubi is a 27-year-old male who sustained a right ankle sprain while at work for [employer].  He was seen by myself 2 to 3 weeks ago and was having difficulty walking and weightbearing.  The patient underwent a short course of physical therapy and since the last visit has weaned out of the cam boot and been discharged from physical therapy.  He is here today for final evaluation and return to work       Social History     Occupational History   • Not on file   Tobacco Use   • Smoking status: Never   • Smokeless tobacco: Never   Vaping Use   • Vaping Use: Never used   Substance and Sexual Activity   • Alcohol use: Never   • Drug use: Never   • Sexual activity: Defer      Past Medical History:   Diagnosis Date   • ADHD    • Asthma    • Depression    • Tourette's      Past Surgical History:   Procedure Laterality Date   • EAR TUBES         No family history on file.            Objective:  Physical Exam    Vital signs reviewed.   General: No acute distress.  Eyes: conjunctiva clear; pupils equally round and reactive  ENT: external ears and nose atraumatic; oropharynx clear  CV: no peripheral edema  Resp: normal respiratory effort  Skin: no rashes or wounds; normal turgor  Psych: mood and affect appropriate; recent and remote memory intact    Vitals:    10/14/22 0946   Weight: 92.5 kg (204 lb)   Height: 182.9 cm (72\")         10/14/22  0946   Weight: 92.5 kg (204 lb)     Body mass index is 27.67 kg/m².      Right Ankle Exam     Tenderness   The patient is experiencing no tenderness.  Swelling: none    Range of Motion   Dorsiflexion: normal   Plantar flexion: normal   Eversion: normal   Inversion: normal     Muscle Strength   Dorsiflexion:  5/5  Plantar flexion:  5/5  Anterior tibial:  5/5  Posterior tibial:  5/5  Gastrocsoleus:  5/5  Peroneal muscle:  5/5    Tests   Anterior drawer: " negative    Other   Erythema: absent  Scars: absent  Sensation: normal  Pulse: present                  Imaging:    Assessment:        1. Sprain of calcaneofibular ligament of right ankle, initial encounter           Plan:  1. I discussed with the patient that I think he has made a complete recovery.  The patient has been discharged from physical therapy and the patient is eager to return to work.  Work release form was filled out by myself and the patient may return to work without restrictions on 10/18/2022.  2. Follow up as needed    Orders:  No orders of the defined types were placed in this encounter.    No orders of the defined types were placed in this encounter.          I ordered and reviewed the RILEY today.       Transcribed from ambient dictation for Memo Colbert MD by Nelly Thomas MA.  10/14/22   09:47 EDT

## 2024-07-21 ENCOUNTER — APPOINTMENT (OUTPATIENT)
Dept: GENERAL RADIOLOGY | Facility: HOSPITAL | Age: 29
End: 2024-07-21
Payer: OTHER MISCELLANEOUS

## 2024-07-21 ENCOUNTER — HOSPITAL ENCOUNTER (EMERGENCY)
Facility: HOSPITAL | Age: 29
Discharge: HOME | End: 2024-07-21
Attending: EMERGENCY MEDICINE | Admitting: EMERGENCY MEDICINE
Payer: OTHER MISCELLANEOUS

## 2024-07-21 VITALS
TEMPERATURE: 97.5 F | OXYGEN SATURATION: 98 % | BODY MASS INDEX: 24.11 KG/M2 | SYSTOLIC BLOOD PRESSURE: 113 MMHG | DIASTOLIC BLOOD PRESSURE: 78 MMHG | HEART RATE: 74 BPM | HEIGHT: 72 IN | WEIGHT: 178 LBS | RESPIRATION RATE: 16 BRPM

## 2024-07-21 DIAGNOSIS — S73.101A HIP SPRAIN, RIGHT, INITIAL ENCOUNTER: Primary | ICD-10-CM

## 2024-07-21 PROCEDURE — 73502 X-RAY EXAM HIP UNI 2-3 VIEWS: CPT

## 2024-07-21 PROCEDURE — 99283 EMERGENCY DEPT VISIT LOW MDM: CPT
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
## 2024-07-21 NOTE — Clinical Note
MISAH CHAPIN  [facility] EMERGENCY DEPARTMENT  [address] [place] [address]  Phone: [phone]    Hank Rubi was seen and treated in our emergency department on 7/21/2024.  He may return to work on 07/24/2024.         Thank you for choosing [facility].    Vernon Bell MD

## 2024-07-21 NOTE — DISCHARGE INSTRUCTIONS
Apply ice to the right hip for 20 minutes every 2-3 hours while awake for 2 to 3 days, then apply moist heat to the right hip for 20 minutes every 2-3 hours while awake for 2 to 3 days.  Take Motrin or Tylenol as needed as directed for pain.  Call Farhana Griffith on Monday for follow-up appointment within 1 week.  Return to the emergency department if there is increased pain, numbness, tingling, weakness, change in color or temperature of the right lower extremity, worse in any way at all.

## 2024-07-23 ENCOUNTER — OFFICE VISIT (OUTPATIENT)
Dept: ORTHOPEDIC SURGERY | Facility: CLINIC | Age: 29
End: 2024-07-23
Payer: OTHER MISCELLANEOUS

## 2024-07-23 VITALS
DIASTOLIC BLOOD PRESSURE: 73 MMHG | WEIGHT: 178 LBS | SYSTOLIC BLOOD PRESSURE: 112 MMHG | HEIGHT: 72 IN | HEART RATE: 74 BPM | BODY MASS INDEX: 24.11 KG/M2

## 2024-07-23 DIAGNOSIS — M76.01 GLUTEAL TENDINITIS OF RIGHT BUTTOCK: Primary | ICD-10-CM

## 2024-07-23 PROCEDURE — 99213 OFFICE O/P EST LOW 20 MIN: CPT | Performed by: ORTHOPAEDIC SURGERY

## 2024-07-23 RX ORDER — MELOXICAM 7.5 MG/1
7.5 TABLET ORAL DAILY
Qty: 30 TABLET | Refills: 5 | Status: SHIPPED | OUTPATIENT
Start: 2024-07-23

## 2024-07-23 RX ORDER — DEXTROAMPHETAMINE SACCHARATE, AMPHETAMINE ASPARTATE MONOHYDRATE, DEXTROAMPHETAMINE SULFATE AND AMPHETAMINE SULFATE 7.5; 7.5; 7.5; 7.5 MG/1; MG/1; MG/1; MG/1
30 CAPSULE, EXTENDED RELEASE ORAL DAILY
COMMUNITY
Start: 2024-06-05 | End: 2024-09-03

## 2024-07-23 RX ORDER — METAXALONE 800 MG/1
800 TABLET ORAL 3 TIMES DAILY
COMMUNITY
Start: 2024-03-11

## 2024-07-23 RX ORDER — ONDANSETRON 4 MG/1
4 TABLET, FILM COATED ORAL
COMMUNITY
Start: 2024-06-03

## 2024-07-23 RX ORDER — DEXAMETHASONE 4 MG/1
4 TABLET ORAL
COMMUNITY
Start: 2024-03-11 | End: 2024-07-23

## 2024-07-23 RX ORDER — PREDNISONE 10 MG/1
10 TABLET ORAL DAILY
Qty: 18 TABLET | Refills: 0 | Status: SHIPPED | OUTPATIENT
Start: 2024-07-23
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
## 2024-07-26 ENCOUNTER — PATIENT ROUNDING (BHMG ONLY) (OUTPATIENT)
Dept: ORTHOPEDIC SURGERY | Facility: CLINIC | Age: 29
End: 2024-07-26
Payer: COMMERCIAL

## 2024-07-26 NOTE — PROGRESS NOTES
A My-Chart message has been sent to the patient for PATIENT ROUNDING with [facility].

## 2024-08-13 ENCOUNTER — OFFICE VISIT (OUTPATIENT)
Dept: ORTHOPEDIC SURGERY | Facility: CLINIC | Age: 29
End: 2024-08-13
Payer: OTHER MISCELLANEOUS

## 2024-08-13 VITALS — WEIGHT: 178 LBS | HEIGHT: 72 IN | BODY MASS INDEX: 24.11 KG/M2

## 2024-08-13 DIAGNOSIS — M76.01 GLUTEAL TENDINITIS OF RIGHT BUTTOCK: Primary | ICD-10-CM

## 2024-08-13 PROCEDURE — 99213 OFFICE O/P EST LOW 20 MIN: CPT | Performed by: ORTHOPAEDIC SURGERY

## 2024-08-13 NOTE — PROGRESS NOTES
Subjective: Right gluteal tendinitis     Patient ID: Hank Rubi is a 29 y.o. male.    Chief Complaint:    History of Present Illness patient returns after completed a 9-day course of steroid and does note significant reduction of his pain but still has some soreness he describes as 3 out of 10.  Has resumed the anti-inflammatories.       Social History     Occupational History    Not on file   Tobacco Use    Smoking status: Never    Smokeless tobacco: Never   Vaping Use    Vaping status: Never Used   Substance and Sexual Activity    Alcohol use: Never    Drug use: Never    Sexual activity: Defer      Review of Systems      Past Medical History:   Diagnosis Date    ADHD     Asthma     Depression     Tourette's      Past Surgical History:   Procedure Laterality Date    EAR TUBES       History reviewed. No pertinent family history.      Objective:  There were no vitals filed for this visit.      08/13/24  1442   Weight: 80.7 kg (178 lb)     Body mass index is 24.14 kg/m².        Ortho Exam   he is alert and oriented x 3.  There are still some tenderness over the greater trochanter.  There is no groin pain.  There is no motor or sensory deficit.  His calf is nontender.  Is tolerating the anti-inflammatories.    Assessment:        1. Gluteal tendinitis of right buttock           Plan:  is a still has some discomfort, continue sitdown work only.  Continue the meloxicam.  Return in 3 weeks to be asymptomatic at that time he should be released and ready for regular duty.  Answered all questions            Work Status:    RILEY query complete.    Orders:  No orders of the defined types were placed in this encounter.      Medications:  No orders of the defined types were placed in this encounter.      Followup:  Return in about 3 weeks (around 9/3/2024).          Dictated utilizing Dragon dictation

## 2024-08-14 ENCOUNTER — TELEPHONE (OUTPATIENT)
Dept: ORTHOPEDIC SURGERY | Facility: CLINIC | Age: 29
End: 2024-08-14

## 2024-08-14 NOTE — TELEPHONE ENCOUNTER
"Caller: Hank Rubi    Relationship: Self    Best call back number: [phone]    What form or medical record are you requesting: DISABILITY PAPERWORK     Who is requesting this form or medical record from you: EMPLOYER     How would you like to receive the form or medical records (pick-up, mail, fax):      Timeframe paperwork needed: ASAP    Additional notes: PAPERWORK WAS FILLED OUT AND SENT. PLEASE CHANGE FORM TO NOTE NO ON ANY ENVIRONMENTAL LIMITATIONS. THEY WILL NOT ACCEPT \"SIT DOWN ONLY\" PLEASE NOTE SITTING DOWN WITH OUT ADDING THE WORD ONLY. PATIENT IS GOING TO STOP BY THE OFFICE TO PICK THIS UP. PLEASE CALL WITH ANY QUESTION. OKAY TO LEAVE A VOICEMAIL.          "

## 2024-08-20 ENCOUNTER — TELEPHONE (OUTPATIENT)
Dept: ORTHOPEDIC SURGERY | Facility: CLINIC | Age: 29
End: 2024-08-20

## 2024-09-03 ENCOUNTER — OFFICE VISIT (OUTPATIENT)
Dept: ORTHOPEDIC SURGERY | Facility: CLINIC | Age: 29
End: 2024-09-03
Payer: OTHER MISCELLANEOUS

## 2024-09-03 VITALS — WEIGHT: 178 LBS | HEIGHT: 72 IN | BODY MASS INDEX: 24.11 KG/M2

## 2024-09-03 DIAGNOSIS — M76.01 GLUTEAL TENDINITIS OF RIGHT BUTTOCK: Primary | ICD-10-CM

## 2024-09-03 PROCEDURE — 99213 OFFICE O/P EST LOW 20 MIN: CPT | Performed by: ORTHOPAEDIC SURGERY

## 2024-09-03 RX ORDER — FLUTICASONE PROPIONATE 50 MCG
2 SPRAY, SUSPENSION (ML) NASAL DAILY
COMMUNITY
Start: 2024-09-02

## 2024-09-03 NOTE — PROGRESS NOTES
Subjective: Right hip pain     Patient ID: Hank Rubi is a 29 y.o. male.    Chief Complaint:    History of Present Illness 29-year male returns with persistent pain discomfort in the hip.  It has improved but still has discomfort that will radiate down the lateral aspect of the leg and even into the lower leg.  Has been taking the anti-inflammatory faithfully.       Social History     Occupational History    Not on file   Tobacco Use    Smoking status: Never     Passive exposure: Never    Smokeless tobacco: Never   Vaping Use    Vaping status: Never Used   Substance and Sexual Activity    Alcohol use: Not Currently     Comment: Sometimes    Drug use: Never    Sexual activity: Yes     Partners: Female     Birth control/protection: Condom      Review of Systems      Past Medical History:   Diagnosis Date    ADHD     Ankle sprain     Asthma     Depression     Fracture of wrist     Fracture, radius     Fracture, ulna     Tourette's      Past Surgical History:   Procedure Laterality Date    EAR TUBES       Family History   Problem Relation Age of Onset    Diabetes Father          Objective:  There were no vitals filed for this visit.      09/03/24  1445   Weight: 80.7 kg (178 lb)     Body mass index is 24.14 kg/m².  BMI is within normal parameters. No other follow-up for BMI required.        Ortho Exam   he is alert and oriented x 3.  He has persistent pain to palpation over the right greater trochanter and pain with abduction against resistance.  There is no groin pain.  The knee shows no swelling effusion erythema and and there is no crepitance.  His calf is nontender.  Is good to pulses no motor or sensory deficit.    Assessment:        1. Gluteal tendinitis of right buttock           Plan: Patient has persistent pain despite the steroids and the anti-inflammatory medication.  It is slowly getting better but has not resolved completely as expected.  I do want to get an MRI to rule out a partial tear of the gluteal  tendon they can be treated with PRP injections.  I will keep him on restrictions and no repetitive stair climbing till seen back after the MRIs been done.  Patient was in agreement.  Answered all questions            Work Status:    RILEY query complete.    Orders:  Orders Placed This Encounter   Procedures    MRI hip right wo contrast       Medications:  No orders of the defined types were placed in this encounter.      Followup:  Return in about 4 weeks (around 10/1/2024).          Dictated utilizing Dragon dictation

## 2024-09-05 ENCOUNTER — TELEPHONE (OUTPATIENT)
Dept: ORTHOPEDIC SURGERY | Facility: CLINIC | Age: 29
End: 2024-09-05

## 2024-09-05 NOTE — TELEPHONE ENCOUNTER
Caller: Hank Rubi    Relationship: Self    Best call back number: [phone]    What is the best time to reach you: ANYTIME     Who are you requesting to speak with (clinical staff, provider,  specific staff member): CLINICAL    Do you know the name of the person who called: PATIENT INCOMING     What was the call regarding: PATIENT WAS RELEASED TO RETURN TO WORK ON 9-3-24. HIS EMPLOYER HAS NOT RECEIVED HIS PAPERWORK FOR THIS AND HE IS UNABLE TO GO BACK  UNTIL THEY HAVE THIS. HE WOULD LIKE TO COME BY THE OFFICE AND PICK THIS UP TODAY.

## 2024-09-05 NOTE — TELEPHONE ENCOUNTER
Caller: ORION    Relationship: RAINE CUENCA     Best call back number: [phone]    What form or medical record are you requesting: FITNESS FOR D[employer]Y    How would you like to receive the form or medical records (pick-up, mail, fax): FAX  If fax, what is the fax number: [phone]

## 2024-09-24 ENCOUNTER — HOSPITAL ENCOUNTER (OUTPATIENT)
Dept: MRI IMAGING | Facility: HOSPITAL | Age: 29
Discharge: HOME | End: 2024-09-24
Admitting: ORTHOPAEDIC SURGERY
Payer: COMMERCIAL

## 2024-09-24 DIAGNOSIS — M76.01 GLUTEAL TENDINITIS OF RIGHT BUTTOCK: ICD-10-CM

## 2024-09-24 PROCEDURE — 73721 MRI JNT OF LWR EXTRE W/O DYE: CPT

## 2024-10-01 ENCOUNTER — OFFICE VISIT (OUTPATIENT)
Dept: ORTHOPEDIC SURGERY | Facility: CLINIC | Age: 29
End: 2024-10-01
Payer: OTHER MISCELLANEOUS

## 2024-10-01 VITALS — HEIGHT: 72 IN | WEIGHT: 178 LBS | BODY MASS INDEX: 24.11 KG/M2

## 2024-10-01 DIAGNOSIS — M76.01 GLUTEAL TENDINITIS OF RIGHT BUTTOCK: Primary | ICD-10-CM

## 2024-10-01 PROCEDURE — 99214 OFFICE O/P EST MOD 30 MIN: CPT | Performed by: ORTHOPAEDIC SURGERY

## 2024-10-01 RX ORDER — CETIRIZINE HYDROCHLORIDE 10 MG/1
TABLET ORAL
COMMUNITY

## 2024-10-01 RX ORDER — DEXTROAMPHETAMINE SACCHARATE, AMPHETAMINE ASPARTATE MONOHYDRATE, DEXTROAMPHETAMINE SULFATE AND AMPHETAMINE SULFATE 7.5; 7.5; 7.5; 7.5 MG/1; MG/1; MG/1; MG/1
CAPSULE, EXTENDED RELEASE ORAL
COMMUNITY
Start: 2024-09-03

## 2024-10-01 NOTE — PROGRESS NOTES
Subjective: Right hip pain     Patient ID: Hank Rubi is a 29 y.o. male.    Chief Complaint:    History of Present Illness 29-year-old male returns to review their report and images of his right hip.  I personally reviewed the images and report which shows mild gluteal tendinopathy so some mild degenerative changes of the labrum of the right hip but no arthritic changes are noted.  He reports he is having minimal pain at this time.  He is currently taking the meloxicam       Social History     Occupational History    Not on file   Tobacco Use    Smoking status: Never     Passive exposure: Never    Smokeless tobacco: Never   Vaping Use    Vaping status: Never Used   Substance and Sexual Activity    Alcohol use: Not Currently     Comment: Sometimes    Drug use: Never    Sexual activity: Yes     Partners: Female     Birth control/protection: Condom      Review of Systems      Past Medical History:   Diagnosis Date    ADHD     Ankle sprain     Asthma     Depression     Fracture of wrist     Fracture, radius     Fracture, ulna     Tourette's      Past Surgical History:   Procedure Laterality Date    EAR TUBES       Family History   Problem Relation Age of Onset    Diabetes Father         Type 2 Diabetes    Cancer Father         Skin Cancer         Objective:  There were no vitals filed for this visit.      10/01/24  1430   Weight: 80.7 kg (178 lb)     Body mass index is 24.14 kg/m².  BMI is within normal parameters. No other follow-up for BMI required.        Ortho Exam   reviewed the MRI report with the patient.  Again it shows some degenerative tearing of the anterior acetabular labrum but he is having no groin pain is completely asymptomatic.  It shows bilateral gluteal tendinopathy but he has no pain at all in the left hip and the right hip now is relatively asymptomatic.  He is tolerating meloxicam with no GI side effect.    Assessment:    MRI Hip Right Without Contrast    Result Date: 9/24/2024  Impression:  Minimal irregular signal within the right anterior superior acetabular labrum which could indicate early degenerative tearing. No evidence of displaced labral tear or paralabral cyst formation. Right hip articular cartilage is intact. Mild bilateral distal gluteal tendinopathy without evidence of tear or bursitis. No evidence of acute fracture or avascular necrosis. Partially visualized T1 hypointense marrow signal within the lumbar spine, which is nonspecific but may represent sequela of marrow reconversion. Electronically Signed: Anibal Colmenares MD  9/24/2024 10:35 AM EDT  Workstation ID: [id number]          1. Gluteal tendinitis of right buttock           Plan: Reviewed the results of the MRI with the patient along with his current symptoms.  He is doing well at this time with minimal discomfort.  He is released to return to work with no restrictions.  I told to continue the meloxicam till is completely asymptomatic.  Return to see me as needed.  Answered all questions            Work Status:    RILEY query complete.    Orders:  No orders of the defined types were placed in this encounter.      Medications:  No orders of the defined types were placed in this encounter.      Followup:  Return if symptoms worsen or fail to improve.          Dictated utilizing Dragon dictation

## 2024-11-14 ENCOUNTER — TELEPHONE (OUTPATIENT)
Dept: ORTHOPEDIC SURGERY | Facility: CLINIC | Age: 29
End: 2024-11-14
Payer: COMMERCIAL

## 2024-11-14 NOTE — TELEPHONE ENCOUNTER
Provider: RAZIA    Caller: Hank Rubi A    Relationship to Patient: Self    Pharmacy: [facility]    Phone Number: [phone]    Reason for Call: PT CALLED AND STATED THAT HE IS STARTING A NEW MEDICATION THAT HE CANNOT TAKE MELOXICAM WITH AND IS WANTING TO IF THERE IS ANOTHER ALTERNATIVE TO THAT THAT WONT COUNTERACT WITH THE NEW MEDICATION - THIS IS FOR PREVENTATIVE CARE - ITS GENERIC FOR PrEP MEDICATION - EMTRICITABINE-TENOFOVIRDISOPROXIL SUMARATE TAB